# Patient Record
Sex: FEMALE | Race: WHITE | URBAN - NONMETROPOLITAN AREA
[De-identification: names, ages, dates, MRNs, and addresses within clinical notes are randomized per-mention and may not be internally consistent; named-entity substitution may affect disease eponyms.]

---

## 2021-09-25 ENCOUNTER — OFFICE VISIT (OUTPATIENT)
Dept: FAMILY MEDICINE CLINIC | Age: 26
End: 2021-09-25
Payer: COMMERCIAL

## 2021-09-25 VITALS
BODY MASS INDEX: 23.07 KG/M2 | HEIGHT: 67 IN | WEIGHT: 147 LBS | TEMPERATURE: 97.2 F | SYSTOLIC BLOOD PRESSURE: 124 MMHG | OXYGEN SATURATION: 99 % | HEART RATE: 77 BPM | DIASTOLIC BLOOD PRESSURE: 82 MMHG

## 2021-09-25 DIAGNOSIS — G44.019 EPISODIC CLUSTER HEADACHE, NOT INTRACTABLE: ICD-10-CM

## 2021-09-25 DIAGNOSIS — G44.011 INTRACTABLE EPISODIC CLUSTER HEADACHE: Primary | ICD-10-CM

## 2021-09-25 PROCEDURE — 96372 THER/PROPH/DIAG INJ SC/IM: CPT | Performed by: INTERNAL MEDICINE

## 2021-09-25 PROCEDURE — 99214 OFFICE O/P EST MOD 30 MIN: CPT | Performed by: INTERNAL MEDICINE

## 2021-09-25 RX ORDER — NAPROXEN 500 MG/1
500 TABLET ORAL 2 TIMES DAILY PRN
Qty: 30 TABLET | Refills: 0 | Status: SHIPPED
Start: 2021-09-25 | End: 2022-07-21

## 2021-09-25 RX ORDER — KETOROLAC TROMETHAMINE 30 MG/ML
30 INJECTION, SOLUTION INTRAMUSCULAR; INTRAVENOUS ONCE
Status: COMPLETED | OUTPATIENT
Start: 2021-09-25 | End: 2021-09-25

## 2021-09-25 RX ORDER — METHYLPREDNISOLONE ACETATE 40 MG/ML
40 INJECTION, SUSPENSION INTRA-ARTICULAR; INTRALESIONAL; INTRAMUSCULAR; SOFT TISSUE ONCE
Status: COMPLETED | OUTPATIENT
Start: 2021-09-25 | End: 2021-09-25

## 2021-09-25 RX ORDER — PREDNISONE 10 MG/1
TABLET ORAL
Qty: 12 TABLET | Refills: 0 | Status: SHIPPED | OUTPATIENT
Start: 2021-09-25 | End: 2021-10-01

## 2021-09-25 RX ADMIN — METHYLPREDNISOLONE ACETATE 40 MG: 40 INJECTION, SUSPENSION INTRA-ARTICULAR; INTRALESIONAL; INTRAMUSCULAR; SOFT TISSUE at 11:30

## 2021-09-25 RX ADMIN — KETOROLAC TROMETHAMINE 30 MG: 30 INJECTION, SOLUTION INTRAMUSCULAR; INTRAVENOUS at 11:30

## 2021-09-26 ASSESSMENT — ENCOUNTER SYMPTOMS
SINUS PRESSURE: 0
PHOTOPHOBIA: 1
NAUSEA: 0
COUGH: 0
EYE DISCHARGE: 0
SINUS PAIN: 0
EYE ITCHING: 0
EYE REDNESS: 1
SORE THROAT: 0
SHORTNESS OF BREATH: 0
EYE PAIN: 0
ABDOMINAL PAIN: 0

## 2021-09-26 NOTE — PROGRESS NOTES
[address] [place]     21  Laurie Patino : 1995 Sex: female  Age: 26 y.o. Chief Complaint   Patient presents with    Migraine     high sensitivity to light and sound; day 6; has cluster headaches; left eye is swollen and blood shot       HPI    Patient presents to express care today accompanied by her father complaining of cluster headaches. States she is gotten them yearly for the last 3 years she is on day #6 currently not getting much relief with current OTC treatment. She does follow with neurology. Sounds like she has been on migraine type medications in the past without success and states that she has made things worse. She does states she has a virtual visit with neurology [facility] on Thursday. Wanting something for relief today. She admits to sensitivity to light and sound. States pain does occur unilaterally over the left eye where she gets redness and tearing. Review of Systems   Constitutional: Negative for chills and fever. HENT: Negative for congestion, ear pain, postnasal drip, sinus pressure, sinus pain and sore throat. Eyes: Positive for photophobia and redness. Negative for pain, discharge, itching and visual disturbance. Does have tearing to the left eye associated with a cluster headaches. Respiratory: Negative for cough and shortness of breath. Cardiovascular: Negative for chest pain. Gastrointestinal: Negative for abdominal pain and nausea. Musculoskeletal: Negative. Skin: Negative. Neurological: Positive for headaches. Negative for dizziness, weakness, light-headedness and numbness. REST OF PERTINENT ROS GONE OVER AND WAS NEGATIVE. Current Outpatient Medications:     predniSONE (DELTASONE) 10 MG tablet, Take 3 tablets by mouth daily for 2 days, THEN 2 tablets daily for 2 days, THEN 1 tablet daily for 2 days. , Disp: 12 tablet, Rfl: 0    naproxen (NAPROSYN) 500 MG tablet, Take 1 tablet by mouth 2 times daily as needed Distress related to her headache. HENT:      Head: Normocephalic and atraumatic. Right Ear: Tympanic membrane, ear canal and external ear normal.      Left Ear: Tympanic membrane, ear canal and external ear normal.      Nose: Nose normal.      Mouth/Throat:      Mouth: Mucous membranes are moist.      Pharynx: Oropharynx is clear. Eyes:      Extraocular Movements: Extraocular movements intact. Pupils: Pupils are equal, round, and reactive to light. Comments: She has redness to her left eye without pain to palpation of the globe. No visual difficulty. Cardiovascular:      Rate and Rhythm: Normal rate and regular rhythm. Heart sounds: No murmur heard. Pulmonary:      Effort: Pulmonary effort is normal. No respiratory distress. Breath sounds: Normal breath sounds. No wheezing, rhonchi or rales. Musculoskeletal:      Cervical back: Normal range of motion and neck supple. No tenderness. Lymphadenopathy:      Cervical: No cervical adenopathy. Skin:     General: Skin is warm and dry. Neurological:      General: No focal deficit present. Mental Status: She is alert and oriented to person, place, and time. Psychiatric:         Mood and Affect: Mood normal.         Behavior: Behavior normal.         Thought Content: Thought content normal.         Judgment: Judgment normal.                 Assessment and Plan:  Hayley Bone was seen today for migraine. Diagnoses and all orders for this visit:    Intractable episodic cluster headache    Other orders  -     ketorolac (TORADOL) injection 30 mg  -     methylPREDNISolone acetate (DEPO-MEDROL) injection 40 mg  -     predniSONE (DELTASONE) 10 MG tablet; Take 3 tablets by mouth daily for 2 days, THEN 2 tablets daily for 2 days, THEN 1 tablet daily for 2 days.  -     naproxen (NAPROSYN) 500 MG tablet; Take 1 tablet by mouth 2 times daily as needed for Pain      Plan: I placed her on 8 L of O2 per facemask for about 15 minutes.  Shot of

## 2023-09-02 VITALS
WEIGHT: 155 LBS | TEMPERATURE: 98.7 F | OXYGEN SATURATION: 98 % | RESPIRATION RATE: 18 BRPM | HEIGHT: 66 IN | BODY MASS INDEX: 24.91 KG/M2 | DIASTOLIC BLOOD PRESSURE: 81 MMHG | HEART RATE: 90 BPM | SYSTOLIC BLOOD PRESSURE: 120 MMHG

## 2023-09-02 PROCEDURE — 99281 EMR DPT VST MAYX REQ PHY/QHP: CPT

## 2023-09-02 ASSESSMENT — PAIN SCALES - GENERAL: PAINLEVEL_OUTOF10: 8

## 2023-09-02 ASSESSMENT — PAIN DESCRIPTION - LOCATION: LOCATION: HEAD

## 2023-09-02 ASSESSMENT — PAIN DESCRIPTION - FREQUENCY: FREQUENCY: CONTINUOUS

## 2023-09-02 ASSESSMENT — PAIN - FUNCTIONAL ASSESSMENT: PAIN_FUNCTIONAL_ASSESSMENT: 0-10

## 2023-09-02 ASSESSMENT — PAIN DESCRIPTION - DESCRIPTORS: DESCRIPTORS: ACHING

## 2023-09-02 ASSESSMENT — PAIN DESCRIPTION - ORIENTATION: ORIENTATION: MID

## 2023-09-03 ENCOUNTER — HOSPITAL ENCOUNTER (EMERGENCY)
Age: 28
Discharge: LEFT BEFORE DISCHARGE | End: 2023-09-03
Payer: COMMERCIAL

## 2023-09-03 NOTE — ED NOTES
Department of Emergency Medicine  FIRST PROVIDER TRIAGE NOTE             Independent MLP           9/2/23  11:50 PM EDT    Date of Encounter: 9/2/23   MRN: [id number]      HPI: Lakia Becker is a 33 y.o. female who presents to the ED for Headache  Patient states that she has had a headache for the past 3 days. She does have a history of cluster headaches, but due to being pregnant she cannot take her migraine medication. She denies chest pain or shortness of breath or dizziness. Patient is approximately 10 weeks pregnant. She denies abdominal pain, vaginal bleeding. ROS: Negative for cp or sob. PE: Gen Appearance/Constitutional: alert  CV: regular rate     Initial Plan of Care: All treatment areas with department are currently occupied. Plan to order/Initiate the following while awaiting opening in ED: .   Initiate Treatment-Testing, Proceed toTreatment Area When Bed Available for ED Attending/MLP to Continue Care    Electronically signed by KARSTEN Carr CNP   DD: 9/2/23       KARSTEN Carr CNP  09/02/23 0074

## 2023-09-14 ENCOUNTER — ANCILLARY PROCEDURE (OUTPATIENT)
Dept: OBGYN CLINIC | Age: 28
End: 2023-09-14
Payer: COMMERCIAL

## 2023-09-14 ENCOUNTER — INITIAL PRENATAL (OUTPATIENT)
Dept: OBGYN CLINIC | Age: 28
End: 2023-09-14
Payer: COMMERCIAL

## 2023-09-14 VITALS
DIASTOLIC BLOOD PRESSURE: 78 MMHG | HEART RATE: 72 BPM | BODY MASS INDEX: 27.3 KG/M2 | WEIGHT: 169.13 LBS | SYSTOLIC BLOOD PRESSURE: 117 MMHG

## 2023-09-14 DIAGNOSIS — Z3A.20 20 WEEKS GESTATION OF PREGNANCY: ICD-10-CM

## 2023-09-14 DIAGNOSIS — G44.019 EPISODIC CLUSTER HEADACHE, NOT INTRACTABLE: Primary | ICD-10-CM

## 2023-09-14 DIAGNOSIS — Z34.02 PRIMIGRAVIDA IN SECOND TRIMESTER: ICD-10-CM

## 2023-09-14 PROCEDURE — 76811 OB US DETAILED SNGL FETUS: CPT | Performed by: OBSTETRICS & GYNECOLOGY

## 2023-09-14 PROCEDURE — 99204 OFFICE O/P NEW MOD 45 MIN: CPT | Performed by: OBSTETRICS & GYNECOLOGY

## 2023-09-14 PROCEDURE — 81002 URINALYSIS NONAUTO W/O SCOPE: CPT | Performed by: OBSTETRICS & GYNECOLOGY

## 2023-09-14 PROCEDURE — 99203 OFFICE O/P NEW LOW 30 MIN: CPT | Performed by: OBSTETRICS & GYNECOLOGY

## 2023-09-14 NOTE — PROGRESS NOTES
Pt here for new patient ultrasound  Pt took prednisone for 6 days for cluster headaches  Pt states good fetal movement  Pt denies any bleeding/cramping

## 2023-09-14 NOTE — PATIENT INSTRUCTIONS
Learning About When to Call Your Doctor During Pregnancy (After 20 Weeks)  Overview  It's common to have concerns about what might be a problem when you're pregnant. Most pregnancies don't have any serious problems. But it's still important to know when to call your doctor if you have certain symptoms or signs of labor. These are general suggestions. Your doctor may give you some more information about when to call. When to call your doctor (after 20 weeks)  Call 911  anytime you think you may need emergency care. For example, call if:  You have severe vaginal bleeding. You have sudden, severe pain in your belly. You passed out (lost consciousness). You have a seizure. You see or feel the umbilical cord. You think you are about to deliver your baby and can't make it safely to the hospital.  Call your doctor now or seek immediate medical care if:  You have vaginal bleeding. You have belly pain. You have a fever. You have symptoms of preeclampsia, such as:  Sudden swelling of your face, hands, or feet. New vision problems (such as dimness, blurring, or seeing spots). A severe headache. You have a sudden release of fluid from your vagina. (You think your water broke.)  You think that you may be in labor. This means that you've had at least 6 contractions in an hour. You notice that your baby has stopped moving or is moving much less than normal.  You have symptoms of a urinary tract infection. These may include:  Pain or burning when you urinate. A frequent need to urinate without being able to pass much urine. Pain in the flank, which is just below the rib cage and above the waist on either side of the back. Blood in your urine. Watch closely for changes in your health, and be sure to contact your doctor if:  You have vaginal discharge that smells bad. You have skin changes, such as:  A rash. Itching. Yellow color to your skin. You have other concerns about your pregnancy.   If you have labor

## 2023-09-14 NOTE — PROGRESS NOTES
[address] [place] [address] FETAL MEDICINE  [address]  [facility] [address]  Dept: [phone]  Loc: [phone]     2023    RE:  Darius Juarez     : 1995   AGE: 33 y.o. Dear Dr. Dewitt Blizzard,    Thank you for allowing me to provide prenatal consultation for Darius Juarez. As I'm sure you will recall, Darius Juarez is a 33 y.o. [id number]'s last menstrual period was 2023. Estimated Date of Delivery: 24 at 20w1d seen in our office today for the following:    REASON FOR CONSULTATION:  Patient Active Problem List    Diagnosis Date Noted    20 weeks gestation of pregnancy 2023    Primigravida in second trimester 2023    Cluster headaches         PAST HISTORY:  OB History    Para Term  AB Living   1 0 0 0 0 0   SAB IAB Ectopic Molar Multiple Live Births   0 0 0   0        # Outcome Date GA Lbr Javon/2nd Weight Sex Delivery Anes PTL Lv   1 Current                   MEDICAL:  Past Medical History:   Diagnosis Date    Cluster headaches     Migraine     Seasonal allergies         SURGICAL:  History reviewed. No pertinent surgical history. ALLERGIES:    Allergies   Allergen Reactions    Compazine [Prochlorperazine]          MEDICATIONS:    Current Outpatient Medications   Medication Sig Dispense Refill    Prenatal Vit-Fe Fumarate-FA (PRENATAL PLUS) 27-1 MG TABS Take 1 tablet by mouth daily 30 tablet 11     No current facility-administered medications for this visit.         Social History     Socioeconomic History    Marital status: Single     Spouse name: None    Number of children: None    Years of education: None    Highest education level: None   Tobacco Use    Smoking status: Never    Smokeless tobacco: Never   Vaping Use    Vaping Use: Never used   Substance and Sexual Activity    Alcohol use: No    Drug use: No    Sexual activity: Yes     Partners: Male          FAMILY MEDICAL HISTORY: